# Patient Record
Sex: MALE | Race: BLACK OR AFRICAN AMERICAN | NOT HISPANIC OR LATINO | URBAN - METROPOLITAN AREA
[De-identification: names, ages, dates, MRNs, and addresses within clinical notes are randomized per-mention and may not be internally consistent; named-entity substitution may affect disease eponyms.]

---

## 2017-05-01 ENCOUNTER — NEW PATIENT (OUTPATIENT)
Dept: URBAN - METROPOLITAN AREA CLINIC 56 | Facility: CLINIC | Age: 57
End: 2017-05-01
Payer: COMMERCIAL

## 2017-05-01 DIAGNOSIS — Z79.84 LONG TERM (CURRENT) USE OF ORAL ANTIDIABETIC DRUGS: ICD-10-CM

## 2017-05-01 DIAGNOSIS — H11.053 PERIPHERAL PTERYGIUM, STATIONARY, BILATERAL: ICD-10-CM

## 2017-05-01 DIAGNOSIS — H40.013 OPEN ANGLE WITH BORDERLINE FINDINGS, LOW RISK, BILATERAL: ICD-10-CM

## 2017-05-01 PROCEDURE — 92004 COMPRE OPH EXAM NEW PT 1/>: CPT | Performed by: OPTOMETRIST

## 2017-05-01 PROCEDURE — 92250 FUNDUS PHOTOGRAPHY W/I&R: CPT | Performed by: OPTOMETRIST

## 2017-05-01 ASSESSMENT — VISUAL ACUITY
OS: 20/20
OD: 20/20

## 2017-05-01 ASSESSMENT — INTRAOCULAR PRESSURE
OD: 18
OS: 17

## 2017-09-06 ENCOUNTER — FOLLOW UP ESTABLISHED (OUTPATIENT)
Dept: URBAN - METROPOLITAN AREA CLINIC 56 | Facility: CLINIC | Age: 57
End: 2017-09-06
Payer: COMMERCIAL

## 2017-09-06 PROCEDURE — 76514 ECHO EXAM OF EYE THICKNESS: CPT | Performed by: OPTOMETRIST

## 2017-09-06 PROCEDURE — 92083 EXTENDED VISUAL FIELD XM: CPT | Performed by: OPTOMETRIST

## 2017-09-06 PROCEDURE — 92133 CPTRZD OPH DX IMG PST SGM ON: CPT | Performed by: OPTOMETRIST

## 2017-09-06 PROCEDURE — 92012 INTRM OPH EXAM EST PATIENT: CPT | Performed by: OPTOMETRIST

## 2017-09-06 ASSESSMENT — INTRAOCULAR PRESSURE
OD: 16
OS: 16

## 2017-09-21 ENCOUNTER — FOLLOW UP ESTABLISHED (OUTPATIENT)
Dept: URBAN - METROPOLITAN AREA CLINIC 56 | Facility: CLINIC | Age: 57
End: 2017-09-21
Payer: COMMERCIAL

## 2017-09-21 DIAGNOSIS — D48.5 NEOPLASM OF UNCERTAIN BEHAVIOR OF SKIN: ICD-10-CM

## 2017-09-21 PROCEDURE — 99213 OFFICE O/P EST LOW 20 MIN: CPT | Performed by: OPHTHALMOLOGY

## 2017-09-21 PROCEDURE — 92025 CPTRIZED CORNEAL TOPOGRAPHY: CPT | Performed by: OPHTHALMOLOGY

## 2017-09-21 ASSESSMENT — INTRAOCULAR PRESSURE
OD: 20
OS: 20

## 2017-10-17 ENCOUNTER — FOLLOW UP ESTABLISHED (OUTPATIENT)
Dept: URBAN - METROPOLITAN AREA CLINIC 56 | Facility: CLINIC | Age: 57
End: 2017-10-17
Payer: COMMERCIAL

## 2017-10-17 PROCEDURE — 92012 INTRM OPH EXAM EST PATIENT: CPT | Performed by: OPHTHALMOLOGY

## 2017-10-17 PROCEDURE — 67840 REMOVE EYELID LESION: CPT | Performed by: OPHTHALMOLOGY

## 2017-10-17 PROCEDURE — 92285 EXTERNAL OCULAR PHOTOGRAPHY: CPT | Performed by: OPHTHALMOLOGY

## 2017-10-17 RX ORDER — SULFAMETHOXAZOLE AND TRIMETHOPRIM 400; 80 MG/1; MG/1
TABLET ORAL
Qty: 20 | Refills: 0 | Status: INACTIVE
Start: 2017-10-17 | End: 2018-01-05

## 2017-11-30 ENCOUNTER — FOLLOW UP ESTABLISHED (OUTPATIENT)
Dept: URBAN - METROPOLITAN AREA CLINIC 56 | Facility: CLINIC | Age: 57
End: 2017-11-30
Payer: COMMERCIAL

## 2017-11-30 DIAGNOSIS — H43.393 OTHER VITREOUS OPACITIES, BILATERAL: ICD-10-CM

## 2017-11-30 DIAGNOSIS — H25.13 AGE-RELATED NUCLEAR CATARACT, BILATERAL: ICD-10-CM

## 2017-11-30 DIAGNOSIS — H11.052 PERIPHERAL PTERYGIUM, PROGRESSIVE, LEFT EYE: ICD-10-CM

## 2017-11-30 DIAGNOSIS — I10 ESSENTIAL (PRIMARY) HYPERTENSION: Primary | ICD-10-CM

## 2017-11-30 PROCEDURE — 92014 COMPRE OPH EXAM EST PT 1/>: CPT | Performed by: OPHTHALMOLOGY

## 2017-11-30 PROCEDURE — 92133 CPTRZD OPH DX IMG PST SGM ON: CPT | Performed by: OPHTHALMOLOGY

## 2017-11-30 PROCEDURE — 92134 CPTRZ OPH DX IMG PST SGM RTA: CPT | Performed by: OPHTHALMOLOGY

## 2017-11-30 RX ORDER — POLYETHYLENE GLYCOL 400 AND PROPYLENE GLYCOL 4; 3 MG/ML; MG/ML
SOLUTION/ DROPS OPHTHALMIC
Qty: 0 | Refills: 0 | Status: ACTIVE
Start: 2017-11-30

## 2017-11-30 RX ORDER — POLYETHYLENE GLYCOL 400 AND PROPYLENE GLYCOL 4; 3 MG/ML; MG/ML
GEL OPHTHALMIC
Qty: 0 | Refills: 0 | Status: INACTIVE
Start: 2017-11-30 | End: 2019-02-26

## 2017-11-30 ASSESSMENT — KERATOMETRY
OS: 44.32
OD: 24.35

## 2017-11-30 ASSESSMENT — VISUAL ACUITY
OS: 20/20
OD: 20/20

## 2017-11-30 ASSESSMENT — INTRAOCULAR PRESSURE
OD: 16
OS: 16

## 2018-01-05 ENCOUNTER — Encounter (OUTPATIENT)
Dept: URBAN - METROPOLITAN AREA CLINIC 56 | Facility: CLINIC | Age: 58
End: 2018-01-05
Payer: MEDICARE

## 2018-01-05 PROCEDURE — 99213 OFFICE O/P EST LOW 20 MIN: CPT | Performed by: PHYSICIAN ASSISTANT

## 2018-01-15 ENCOUNTER — SURGERY (OUTPATIENT)
Dept: URBAN - METROPOLITAN AREA SURGERY 5 | Facility: SURGERY | Age: 58
End: 2018-01-15
Payer: MEDICARE

## 2018-01-15 PROCEDURE — 65426 REMOVAL OF EYE LESION: CPT | Performed by: OPHTHALMOLOGY

## 2018-01-15 RX ORDER — CIPROFLOXACIN HYDROCHLORIDE 3 MG/ML
0.3 % SOLUTION/ DROPS OPHTHALMIC
Qty: 1 | Refills: 2 | Status: INACTIVE
Start: 2018-01-15 | End: 2018-01-31

## 2018-01-15 RX ORDER — PREDNISOLONE ACETATE 10 MG/ML
1 % SUSPENSION/ DROPS OPHTHALMIC
Qty: 1 | Refills: 1 | Status: INACTIVE
Start: 2018-01-15 | End: 2018-01-31

## 2018-01-15 RX ORDER — ERYTHROMYCIN 5 MG/G
OINTMENT OPHTHALMIC
Qty: 1 | Refills: 1 | Status: INACTIVE
Start: 2018-01-15 | End: 2018-01-31

## 2018-01-16 ENCOUNTER — POST OP (OUTPATIENT)
Dept: URBAN - METROPOLITAN AREA CLINIC 56 | Facility: CLINIC | Age: 58
End: 2018-01-16

## 2018-01-16 PROCEDURE — 99024 POSTOP FOLLOW-UP VISIT: CPT | Performed by: OPTOMETRIST

## 2018-01-16 ASSESSMENT — INTRAOCULAR PRESSURE: OS: 11

## 2018-01-25 ENCOUNTER — POST OP (OUTPATIENT)
Dept: URBAN - METROPOLITAN AREA CLINIC 56 | Facility: CLINIC | Age: 58
End: 2018-01-25

## 2018-01-25 PROCEDURE — 99024 POSTOP FOLLOW-UP VISIT: CPT | Performed by: OPTOMETRIST

## 2018-01-25 ASSESSMENT — INTRAOCULAR PRESSURE
OD: 14
OS: 14

## 2018-01-31 ENCOUNTER — POST OP (OUTPATIENT)
Dept: URBAN - METROPOLITAN AREA CLINIC 56 | Facility: CLINIC | Age: 58
End: 2018-01-31
Payer: MEDICARE

## 2018-01-31 PROCEDURE — 99024 POSTOP FOLLOW-UP VISIT: CPT | Performed by: OPHTHALMOLOGY

## 2018-01-31 RX ORDER — POLYETHYLENE GLYCOL 400 AND PROPYLENE GLYCOL 4; 3 MG/ML; MG/ML
SOLUTION/ DROPS OPHTHALMIC
Qty: 0 | Refills: 0 | Status: INACTIVE
Start: 2018-01-31 | End: 2019-02-26

## 2018-01-31 ASSESSMENT — INTRAOCULAR PRESSURE
OS: 14
OD: 16

## 2018-02-15 ENCOUNTER — POST OP (OUTPATIENT)
Dept: URBAN - METROPOLITAN AREA CLINIC 56 | Facility: CLINIC | Age: 58
End: 2018-02-15

## 2018-02-15 DIAGNOSIS — H11.051 PERIPHERAL PTERYGIUM, PROGRESSIVE, RIGHT EYE: Primary | ICD-10-CM

## 2018-02-15 PROCEDURE — 99024 POSTOP FOLLOW-UP VISIT: CPT | Performed by: OPHTHALMOLOGY

## 2018-02-15 ASSESSMENT — INTRAOCULAR PRESSURE
OD: 14
OS: 16

## 2018-04-02 ENCOUNTER — FOLLOW UP ESTABLISHED (OUTPATIENT)
Dept: URBAN - METROPOLITAN AREA CLINIC 56 | Facility: CLINIC | Age: 58
End: 2018-04-02
Payer: MEDICARE

## 2018-04-02 DIAGNOSIS — E11.9 PERIPHERAL PTERYGIUM, PROGRESSIVE, RIGHT EYE: ICD-10-CM

## 2018-04-02 PROCEDURE — 92014 COMPRE OPH EXAM EST PT 1/>: CPT | Performed by: OPHTHALMOLOGY

## 2018-04-02 ASSESSMENT — KERATOMETRY
OD: 44.30
OS: 44.35

## 2018-04-02 ASSESSMENT — VISUAL ACUITY: OD: 20/20

## 2018-04-02 ASSESSMENT — INTRAOCULAR PRESSURE
OD: 15
OD: 17
OS: 16

## 2018-04-13 ENCOUNTER — Encounter (OUTPATIENT)
Dept: URBAN - METROPOLITAN AREA CLINIC 56 | Facility: CLINIC | Age: 58
End: 2018-04-13
Payer: MEDICARE

## 2018-04-13 DIAGNOSIS — Z01.818 ENCOUNTER FOR OTHER PREPROCEDURAL EXAMINATION: Primary | ICD-10-CM

## 2018-04-13 PROCEDURE — 99213 OFFICE O/P EST LOW 20 MIN: CPT | Performed by: PHYSICIAN ASSISTANT

## 2018-04-27 ENCOUNTER — SURGERY (OUTPATIENT)
Dept: URBAN - METROPOLITAN AREA SURGERY 5 | Facility: SURGERY | Age: 58
End: 2018-04-27
Payer: MEDICARE

## 2018-04-27 PROCEDURE — 65426 REMOVAL OF EYE LESION: CPT | Performed by: OPHTHALMOLOGY

## 2018-04-27 RX ORDER — OFLOXACIN 3 MG/ML
0.3 % SOLUTION/ DROPS OPHTHALMIC
Qty: 1 | Refills: 1 | Status: INACTIVE
Start: 2018-04-27 | End: 2018-05-03

## 2018-04-28 ENCOUNTER — POST OP (OUTPATIENT)
Dept: URBAN - METROPOLITAN AREA CLINIC 10 | Facility: CLINIC | Age: 58
End: 2018-04-28

## 2018-04-28 PROCEDURE — 99024 POSTOP FOLLOW-UP VISIT: CPT | Performed by: OPTOMETRIST

## 2018-05-03 ENCOUNTER — POST OP (OUTPATIENT)
Dept: URBAN - METROPOLITAN AREA CLINIC 56 | Facility: CLINIC | Age: 58
End: 2018-05-03

## 2018-05-03 PROCEDURE — 99024 POSTOP FOLLOW-UP VISIT: CPT | Performed by: OPHTHALMOLOGY

## 2018-05-03 RX ORDER — PREDNISOLONE ACETATE 10 MG/ML
1 % SUSPENSION/ DROPS OPHTHALMIC
Qty: 1 | Refills: 1 | Status: INACTIVE
Start: 2018-05-03 | End: 2019-02-26

## 2018-05-03 ASSESSMENT — INTRAOCULAR PRESSURE
OS: 18
OD: 18

## 2018-05-15 ENCOUNTER — POST OP (OUTPATIENT)
Dept: URBAN - METROPOLITAN AREA CLINIC 56 | Facility: CLINIC | Age: 58
End: 2018-05-15

## 2018-05-15 PROCEDURE — 99024 POSTOP FOLLOW-UP VISIT: CPT | Performed by: OPHTHALMOLOGY

## 2018-05-15 ASSESSMENT — INTRAOCULAR PRESSURE
OD: 15
OS: 16

## 2018-11-30 ENCOUNTER — FOLLOW UP ESTABLISHED (OUTPATIENT)
Dept: URBAN - METROPOLITAN AREA CLINIC 56 | Facility: CLINIC | Age: 58
End: 2018-11-30
Payer: MEDICARE

## 2018-11-30 DIAGNOSIS — H16.223 KERATOCONJUNCTIVITIS SICCA, BILATERAL: Primary | ICD-10-CM

## 2018-11-30 PROCEDURE — 92012 INTRM OPH EXAM EST PATIENT: CPT | Performed by: OPHTHALMOLOGY

## 2018-11-30 ASSESSMENT — VISUAL ACUITY
OS: 20/20
OD: 20/20

## 2018-11-30 ASSESSMENT — INTRAOCULAR PRESSURE
OD: 17
OS: 16

## 2019-02-26 ENCOUNTER — FOLLOW UP ESTABLISHED (OUTPATIENT)
Dept: URBAN - METROPOLITAN AREA CLINIC 56 | Facility: CLINIC | Age: 59
End: 2019-02-26
Payer: MEDICARE

## 2019-02-26 PROCEDURE — 92012 INTRM OPH EXAM EST PATIENT: CPT | Performed by: OPTOMETRIST

## 2019-02-26 RX ORDER — CEPHALEXIN 250 MG/1
250 MG CAPSULE ORAL
Qty: 28 | Refills: 0 | Status: ACTIVE
Start: 2019-02-26

## 2019-02-26 ASSESSMENT — INTRAOCULAR PRESSURE
OS: 16
OD: 16

## 2019-03-05 ENCOUNTER — FOLLOW UP ESTABLISHED (OUTPATIENT)
Dept: URBAN - METROPOLITAN AREA CLINIC 56 | Facility: CLINIC | Age: 59
End: 2019-03-05
Payer: MEDICARE

## 2019-03-05 DIAGNOSIS — H00.014 HORDEOLUM EXTERNUM LEFT UPPER EYELID: ICD-10-CM

## 2019-03-05 DIAGNOSIS — H52.4 PRESBYOPIA: ICD-10-CM

## 2019-03-05 PROCEDURE — 92083 EXTENDED VISUAL FIELD XM: CPT | Performed by: OPTOMETRIST

## 2019-03-05 PROCEDURE — 92015 DETERMINE REFRACTIVE STATE: CPT | Performed by: OPTOMETRIST

## 2019-03-05 PROCEDURE — 92014 COMPRE OPH EXAM EST PT 1/>: CPT | Performed by: OPTOMETRIST

## 2019-03-05 ASSESSMENT — INTRAOCULAR PRESSURE
OS: 18
OD: 19

## 2020-08-07 ENCOUNTER — FOLLOW UP ESTABLISHED (OUTPATIENT)
Dept: URBAN - METROPOLITAN AREA CLINIC 56 | Facility: CLINIC | Age: 60
End: 2020-08-07
Payer: COMMERCIAL

## 2020-08-07 DIAGNOSIS — H04.123 TEAR FILM INSUFFICIENCY OF BILATERAL LACRIMAL GLANDS: ICD-10-CM

## 2020-08-07 PROCEDURE — 92133 CPTRZD OPH DX IMG PST SGM ON: CPT | Performed by: OPTOMETRIST

## 2020-08-07 PROCEDURE — 92014 COMPRE OPH EXAM EST PT 1/>: CPT | Performed by: OPTOMETRIST

## 2020-08-07 ASSESSMENT — KERATOMETRY
OD: 44.30
OS: 44.15

## 2020-08-07 ASSESSMENT — INTRAOCULAR PRESSURE
OS: 18
OD: 18

## 2020-08-07 ASSESSMENT — VISUAL ACUITY
OD: 20/20
OS: 20/20

## 2021-02-09 ENCOUNTER — FOLLOW UP ESTABLISHED (OUTPATIENT)
Dept: URBAN - METROPOLITAN AREA CLINIC 56 | Facility: CLINIC | Age: 61
End: 2021-02-09
Payer: COMMERCIAL

## 2021-02-09 PROCEDURE — 99213 OFFICE O/P EST LOW 20 MIN: CPT | Performed by: OPTOMETRIST

## 2021-02-09 PROCEDURE — 92083 EXTENDED VISUAL FIELD XM: CPT | Performed by: OPTOMETRIST

## 2021-02-09 ASSESSMENT — INTRAOCULAR PRESSURE
OD: 20
OS: 18

## 2021-11-02 ENCOUNTER — OFFICE VISIT (OUTPATIENT)
Dept: URBAN - METROPOLITAN AREA CLINIC 56 | Facility: CLINIC | Age: 61
End: 2021-11-02
Payer: COMMERCIAL

## 2021-11-02 DIAGNOSIS — H00.015 HORDEOLUM EXTERNUM LEFT LOWER EYELID: ICD-10-CM

## 2021-11-02 DIAGNOSIS — H25.813 COMBINED FORMS OF AGE-RELATED CATARACT, BILATERAL: ICD-10-CM

## 2021-11-02 PROCEDURE — 99214 OFFICE O/P EST MOD 30 MIN: CPT | Performed by: OPTOMETRIST

## 2021-11-02 PROCEDURE — BRUDE BRUDER EYE MOIST COMPRESS: CUSTOM | Performed by: OPTOMETRIST

## 2021-11-02 PROCEDURE — 92134 CPTRZ OPH DX IMG PST SGM RTA: CPT | Performed by: OPTOMETRIST

## 2021-11-02 PROCEDURE — 92250 FUNDUS PHOTOGRAPHY W/I&R: CPT | Performed by: OPTOMETRIST

## 2021-11-02 PROCEDURE — 92133 CPTRZD OPH DX IMG PST SGM ON: CPT | Performed by: OPTOMETRIST

## 2021-11-02 RX ORDER — DOXYCYCLINE HYCLATE 100 MG/1
100 MG TABLET, COATED ORAL
Qty: 30 | Refills: 3 | Status: ACTIVE
Start: 2021-11-02

## 2021-11-02 ASSESSMENT — KERATOMETRY
OD: 44.30
OS: 43.58

## 2021-11-02 ASSESSMENT — INTRAOCULAR PRESSURE
OD: 18
OS: 18

## 2021-11-02 ASSESSMENT — VISUAL ACUITY
OS: 20/20
OD: 20/20

## 2021-11-02 NOTE — IMPRESSION/PLAN
Impression: Hordeolum externum left lower eyelid: H00.015. Plan: Bothersome to patient. Discussed chronic nature. Start dry warm compresses daily for 5 min daily. Start Doxycycline 100 mg QD PO - 3 month supply Discussed removal of hordeolum with Dr Romelia Castorena Recommend Dr Romelia Castorena consult

## 2021-11-02 NOTE — IMPRESSION/PLAN
Impression: Open angle with borderline findings, low risk, bilateral
- Denies fhx Plan: Secondary to mild disc asymmetry OD>OS with thin corneas. IOP and testing are stable. Will not start treatment. 
RTC 6 months IOP, VF 24-2, OCT RNFL/GCA

## 2022-05-02 ENCOUNTER — OFFICE VISIT (OUTPATIENT)
Dept: URBAN - METROPOLITAN AREA CLINIC 56 | Facility: CLINIC | Age: 62
End: 2022-05-02
Payer: MEDICARE

## 2022-05-02 DIAGNOSIS — H40.013 OPEN ANGLE WITH BORDERLINE FINDINGS, LOW RISK, BILATERAL: Primary | ICD-10-CM

## 2022-05-02 PROCEDURE — 99213 OFFICE O/P EST LOW 20 MIN: CPT | Performed by: OPTOMETRIST

## 2022-05-02 PROCEDURE — 92083 EXTENDED VISUAL FIELD XM: CPT | Performed by: OPTOMETRIST

## 2022-05-02 ASSESSMENT — INTRAOCULAR PRESSURE
OD: 17
OS: 17

## 2022-05-02 NOTE — IMPRESSION/PLAN
Impression: Open angle with borderline findings, low risk, bilateral
- Denies fhx Plan: Secondary to mild disc asymmetry OD>OS with thin corneas. IOP and testing are stable. Will not start treatment. 
RTC 6 months CE

## 2023-03-07 ENCOUNTER — OFFICE VISIT (OUTPATIENT)
Dept: URBAN - METROPOLITAN AREA CLINIC 56 | Facility: LOCATION | Age: 63
End: 2023-03-07
Payer: MEDICARE

## 2023-03-07 DIAGNOSIS — L03.213 PRESEPTAL CELLULITIS: Primary | ICD-10-CM

## 2023-03-07 PROCEDURE — 99214 OFFICE O/P EST MOD 30 MIN: CPT | Performed by: OPTOMETRIST

## 2023-03-07 RX ORDER — AMOXICILLIN AND CLAVULANATE POTASSIUM 875; 125 MG/1; MG/1
TABLET, FILM COATED ORAL
Qty: 20 | Refills: 0 | Status: ACTIVE
Start: 2023-03-07

## 2023-03-07 ASSESSMENT — INTRAOCULAR PRESSURE
OD: 20
OS: 19

## 2023-03-07 NOTE — IMPRESSION/PLAN
Impression: Preseptal cellulitis: P65.536. Plan: Patient education no obvious foreign body in OD. There are 2 large forming hordeolum RUL Continue dry warm compresses frequently. Start Augmentin 875mg BID OU for 10 days. RTC next Monday for follow up, sooner if problem worsens.

## 2023-11-14 ENCOUNTER — APPOINTMENT (RX ONLY)
Dept: URBAN - METROPOLITAN AREA CLINIC 174 | Facility: CLINIC | Age: 63
Setting detail: DERMATOLOGY
End: 2023-11-14

## 2023-11-14 DIAGNOSIS — L81.0 POSTINFLAMMATORY HYPERPIGMENTATION: ICD-10-CM

## 2023-11-14 DIAGNOSIS — L57.8 OTHER SKIN CHANGES DUE TO CHRONIC EXPOSURE TO NONIONIZING RADIATION: ICD-10-CM

## 2023-11-14 PROCEDURE — ? PRESCRIPTION MEDICATION MANAGEMENT

## 2023-11-14 PROCEDURE — ? COUNSELING

## 2023-11-14 PROCEDURE — 99203 OFFICE O/P NEW LOW 30 MIN: CPT

## 2023-11-14 PROCEDURE — ? PRESCRIPTION

## 2023-11-14 RX ORDER — FLUOROURACIL 5 MG/G
CREAM TOPICAL BID
Qty: 40 | Refills: 1 | Status: ERX | COMMUNITY
Start: 2023-11-14

## 2023-11-14 RX ADMIN — FLUOROURACIL: 5 CREAM TOPICAL at 00:00

## 2023-11-14 ASSESSMENT — LOCATION DETAILED DESCRIPTION DERM: LOCATION DETAILED: NASAL DORSUM

## 2023-11-14 ASSESSMENT — LOCATION ZONE DERM: LOCATION ZONE: NOSE

## 2023-11-14 ASSESSMENT — LOCATION SIMPLE DESCRIPTION DERM: LOCATION SIMPLE: NOSE

## 2023-11-14 NOTE — HPI: SKIN LESION
What Type Of Note Output Would You Prefer (Optional)?: Standard Output
How Severe Is Your Skin Lesion?: mild
Has Your Skin Lesion Been Treated?: not been treated
Is This A New Presentation, Or A Follow-Up?: Sun Spot
Additional History: Pt states he was seen previously at a different office over 7 years ago. Was told it was skin cancer from the sun. Was prescribed imiquimod but now the area is larger and darker.

## 2023-11-14 NOTE — PROCEDURE: PRESCRIPTION MEDICATION MANAGEMENT
Detail Level: Zone
Plan: Pt will return in 2 weeks for reevaluation.
Render In Strict Bullet Format?: No
Initiate Treatment: fluorouracil 5 % topical cream Apply to nose twice daily for 2 weeks.

## 2023-12-04 ENCOUNTER — APPOINTMENT (RX ONLY)
Dept: URBAN - METROPOLITAN AREA CLINIC 174 | Facility: CLINIC | Age: 63
Setting detail: DERMATOLOGY
End: 2023-12-04

## 2023-12-04 DIAGNOSIS — L81.0 POSTINFLAMMATORY HYPERPIGMENTATION: ICD-10-CM

## 2023-12-04 DIAGNOSIS — L57.8 OTHER SKIN CHANGES DUE TO CHRONIC EXPOSURE TO NONIONIZING RADIATION: ICD-10-CM

## 2023-12-04 DIAGNOSIS — L30.9 DERMATITIS, UNSPECIFIED: ICD-10-CM

## 2023-12-04 PROCEDURE — 99213 OFFICE O/P EST LOW 20 MIN: CPT

## 2023-12-04 PROCEDURE — ? PRESCRIPTION MEDICATION MANAGEMENT

## 2023-12-04 PROCEDURE — ? PRESCRIPTION

## 2023-12-04 PROCEDURE — ? COUNSELING

## 2023-12-04 RX ORDER — DESONIDE 0.5 MG/G
CREAM TOPICAL BID
Qty: 15 | Refills: 0 | Status: ERX | COMMUNITY
Start: 2023-12-04

## 2023-12-04 RX ADMIN — DESONIDE: 0.5 CREAM TOPICAL at 00:00

## 2023-12-04 ASSESSMENT — LOCATION DETAILED DESCRIPTION DERM
LOCATION DETAILED: NASAL SUPRATIP
LOCATION DETAILED: NASAL DORSUM

## 2023-12-04 ASSESSMENT — LOCATION ZONE DERM: LOCATION ZONE: NOSE

## 2023-12-04 ASSESSMENT — LOCATION SIMPLE DESCRIPTION DERM: LOCATION SIMPLE: NOSE

## 2023-12-04 NOTE — PROCEDURE: PRESCRIPTION MEDICATION MANAGEMENT
Plan: Patient completed 2 weeks of fluorouracil BID with Improvement advised to discontinue.
Render In Strict Bullet Format?: No
Detail Level: Zone
Initiate Treatment: desonide 0.05 % topical cream BID\\nSig: Apply twice daily to rash up to 2 weeks/month as needed.
Plan: Will re check in 6 weeks

## 2025-07-22 ENCOUNTER — OFFICE VISIT (OUTPATIENT)
Dept: URBAN - METROPOLITAN AREA CLINIC 56 | Facility: LOCATION | Age: 65
End: 2025-07-22
Payer: MEDICARE

## 2025-07-22 DIAGNOSIS — H25.813 COMBINED FORMS OF AGE-RELATED CATARACT, BILATERAL: ICD-10-CM

## 2025-07-22 DIAGNOSIS — E11.9 TYPE 2 DIABETES MELLITUS WITHOUT COMPLICATIONS: Primary | ICD-10-CM

## 2025-07-22 DIAGNOSIS — Z79.84 LONG TERM (CURRENT) USE OF ORAL ANTIDIABETIC DRUGS: ICD-10-CM

## 2025-07-22 DIAGNOSIS — H40.013 OPEN ANGLE WITH BORDERLINE FINDINGS, LOW RISK, BILATERAL: ICD-10-CM

## 2025-07-22 DIAGNOSIS — H02.889 MEIBOMIAN GLAND DYSFUNCTION OF EYE: ICD-10-CM

## 2025-07-22 PROCEDURE — 92014 COMPRE OPH EXAM EST PT 1/>: CPT | Performed by: OPTOMETRIST

## 2025-07-22 PROCEDURE — 92133 CPTRZD OPH DX IMG PST SGM ON: CPT | Performed by: OPTOMETRIST

## 2025-07-22 RX ORDER — DOXYCYCLINE HYCLATE 100 MG/1
100 MG TABLET, COATED ORAL
Qty: 90 | Refills: 0 | Status: ACTIVE
Start: 2025-07-22

## 2025-07-22 ASSESSMENT — VISUAL ACUITY
OD: 20/20
OS: 20/20

## 2025-07-22 ASSESSMENT — INTRAOCULAR PRESSURE
OS: 15
OD: 15

## 2025-07-22 ASSESSMENT — KERATOMETRY
OD: 44.06
OS: 43.72